# Patient Record
Sex: MALE | Race: WHITE | Employment: FULL TIME | URBAN - METROPOLITAN AREA
[De-identification: names, ages, dates, MRNs, and addresses within clinical notes are randomized per-mention and may not be internally consistent; named-entity substitution may affect disease eponyms.]

---

## 2024-01-18 ENCOUNTER — HOSPITAL ENCOUNTER (EMERGENCY)
Facility: HOSPITAL | Age: 44
Discharge: HOME | End: 2024-01-18
Attending: EMERGENCY MEDICINE
Payer: COMMERCIAL

## 2024-01-18 ENCOUNTER — OFFICE VISIT (OUTPATIENT)
Dept: FAMILY MEDICINE CLINIC | Facility: CLINIC | Age: 44
End: 2024-01-18
Payer: COMMERCIAL

## 2024-01-18 ENCOUNTER — APPOINTMENT (OUTPATIENT)
Dept: GENERAL RADIOLOGY | Facility: HOSPITAL | Age: 44
End: 2024-01-18
Attending: EMERGENCY MEDICINE
Payer: COMMERCIAL

## 2024-01-18 VITALS
BODY MASS INDEX: 24.34 KG/M2 | WEIGHT: 170 LBS | OXYGEN SATURATION: 97 % | TEMPERATURE: 98 F | HEART RATE: 66 BPM | HEIGHT: 70 IN | DIASTOLIC BLOOD PRESSURE: 96 MMHG | SYSTOLIC BLOOD PRESSURE: 149 MMHG | RESPIRATION RATE: 18 BRPM

## 2024-01-18 VITALS
WEIGHT: 170 LBS | BODY MASS INDEX: 24.34 KG/M2 | TEMPERATURE: 98 F | RESPIRATION RATE: 21 BRPM | SYSTOLIC BLOOD PRESSURE: 128 MMHG | HEIGHT: 70 IN | OXYGEN SATURATION: 95 % | DIASTOLIC BLOOD PRESSURE: 85 MMHG | HEART RATE: 57 BPM

## 2024-01-18 DIAGNOSIS — R07.9 CHEST PAIN, UNSPECIFIED TYPE: Primary | ICD-10-CM

## 2024-01-18 DIAGNOSIS — R10.13 EPIGASTRIC PAIN: Primary | ICD-10-CM

## 2024-01-18 LAB
ALBUMIN SERPL-MCNC: 4.9 G/DL (ref 3.2–4.8)
ALP LIVER SERPL-CCNC: 63 U/L
ALT SERPL-CCNC: 59 U/L
ANION GAP SERPL CALC-SCNC: 7 MMOL/L (ref 0–18)
AST SERPL-CCNC: 44 U/L (ref ?–34)
ATRIAL RATE: 73 BPM
BASOPHILS # BLD AUTO: 0.04 X10(3) UL (ref 0–0.2)
BASOPHILS NFR BLD AUTO: 0.6 %
BILIRUB DIRECT SERPL-MCNC: 0.2 MG/DL (ref ?–0.3)
BILIRUB SERPL-MCNC: 0.7 MG/DL (ref 0.3–1.2)
BUN BLD-MCNC: 13 MG/DL (ref 9–23)
BUN/CREAT SERPL: 12.6 (ref 10–20)
CALCIUM BLD-MCNC: 9.9 MG/DL (ref 8.7–10.4)
CHLORIDE SERPL-SCNC: 108 MMOL/L (ref 98–112)
CO2 SERPL-SCNC: 26 MMOL/L (ref 21–32)
CREAT BLD-MCNC: 1.03 MG/DL
DEPRECATED RDW RBC AUTO: 40.5 FL (ref 35.1–46.3)
EGFRCR SERPLBLD CKD-EPI 2021: 91 ML/MIN/1.73M2 (ref 60–?)
EOSINOPHIL # BLD AUTO: 0.39 X10(3) UL (ref 0–0.7)
EOSINOPHIL NFR BLD AUTO: 5.8 %
ERYTHROCYTE [DISTWIDTH] IN BLOOD BY AUTOMATED COUNT: 11.9 % (ref 11–15)
GLUCOSE BLD-MCNC: 91 MG/DL (ref 70–99)
HCT VFR BLD AUTO: 47.3 %
HGB BLD-MCNC: 16.4 G/DL
IMM GRANULOCYTES # BLD AUTO: 0.02 X10(3) UL (ref 0–1)
IMM GRANULOCYTES NFR BLD: 0.3 %
LIPASE SERPL-CCNC: 48 U/L (ref 13–75)
LYMPHOCYTES # BLD AUTO: 2.18 X10(3) UL (ref 1–4)
LYMPHOCYTES NFR BLD AUTO: 32.4 %
MCH RBC QN AUTO: 31.8 PG (ref 26–34)
MCHC RBC AUTO-ENTMCNC: 34.7 G/DL (ref 31–37)
MCV RBC AUTO: 91.8 FL
MONOCYTES # BLD AUTO: 0.74 X10(3) UL (ref 0.1–1)
MONOCYTES NFR BLD AUTO: 11 %
NEUTROPHILS # BLD AUTO: 3.36 X10 (3) UL (ref 1.5–7.7)
NEUTROPHILS # BLD AUTO: 3.36 X10(3) UL (ref 1.5–7.7)
NEUTROPHILS NFR BLD AUTO: 49.9 %
OSMOLALITY SERPL CALC.SUM OF ELEC: 292 MOSM/KG (ref 275–295)
P AXIS: 54 DEGREES
P-R INTERVAL: 128 MS
PLATELET # BLD AUTO: 239 10(3)UL (ref 150–450)
POTASSIUM SERPL-SCNC: 3.9 MMOL/L (ref 3.5–5.1)
PROT SERPL-MCNC: 7.7 G/DL (ref 5.7–8.2)
Q-T INTERVAL: 384 MS
QRS DURATION: 114 MS
QTC CALCULATION (BEZET): 423 MS
R AXIS: 55 DEGREES
RBC # BLD AUTO: 5.15 X10(6)UL
SODIUM SERPL-SCNC: 141 MMOL/L (ref 136–145)
T AXIS: 57 DEGREES
TROPONIN I SERPL HS-MCNC: 3 NG/L
VENTRICULAR RATE: 73 BPM
WBC # BLD AUTO: 6.7 X10(3) UL (ref 4–11)

## 2024-01-18 PROCEDURE — 3080F DIAST BP >= 90 MM HG: CPT | Performed by: NURSE PRACTITIONER

## 2024-01-18 PROCEDURE — 36415 COLL VENOUS BLD VENIPUNCTURE: CPT

## 2024-01-18 PROCEDURE — 83690 ASSAY OF LIPASE: CPT | Performed by: EMERGENCY MEDICINE

## 2024-01-18 PROCEDURE — 80076 HEPATIC FUNCTION PANEL: CPT | Performed by: EMERGENCY MEDICINE

## 2024-01-18 PROCEDURE — 80048 BASIC METABOLIC PNL TOTAL CA: CPT | Performed by: EMERGENCY MEDICINE

## 2024-01-18 PROCEDURE — 99215 OFFICE O/P EST HI 40 MIN: CPT | Performed by: NURSE PRACTITIONER

## 2024-01-18 PROCEDURE — 3077F SYST BP >= 140 MM HG: CPT | Performed by: NURSE PRACTITIONER

## 2024-01-18 PROCEDURE — 84484 ASSAY OF TROPONIN QUANT: CPT | Performed by: EMERGENCY MEDICINE

## 2024-01-18 PROCEDURE — 93005 ELECTROCARDIOGRAM TRACING: CPT

## 2024-01-18 PROCEDURE — 85025 COMPLETE CBC W/AUTO DIFF WBC: CPT | Performed by: EMERGENCY MEDICINE

## 2024-01-18 PROCEDURE — 3008F BODY MASS INDEX DOCD: CPT | Performed by: NURSE PRACTITIONER

## 2024-01-18 PROCEDURE — 99285 EMERGENCY DEPT VISIT HI MDM: CPT

## 2024-01-18 PROCEDURE — 71045 X-RAY EXAM CHEST 1 VIEW: CPT | Performed by: EMERGENCY MEDICINE

## 2024-01-18 PROCEDURE — 99284 EMERGENCY DEPT VISIT MOD MDM: CPT

## 2024-01-18 PROCEDURE — 93010 ELECTROCARDIOGRAM REPORT: CPT

## 2024-01-18 RX ORDER — GEMFIBROZIL 600 MG/1
600 TABLET, FILM COATED ORAL 2 TIMES DAILY
COMMUNITY
Start: 2024-01-12

## 2024-01-18 NOTE — ED PROVIDER NOTES
Patient Seen in: [facility] Emergency Department    History     Chief Complaint   Patient presents with    Chest Pain Angina     Chest pain, abdominal pulsating, radiating left arm pain       HPI    43-year-old male with past medical history significant for hypertriglyceridemia presents to the ED for evaluation of epigastric abdominal discomfort that started last night.  He states that he had a very sharp discomfort that lasted for several minutes and then since then he has had more dull discomfort.  Right now he states that he feels like that dull discomfort radiates to the center of his back.  He denies any nausea, vomiting, shortness of breath, fever, chills, diarrhea, constipation, sweats.    History reviewed.   Past Medical History:   Diagnosis Date    H. pylori infection     Hyperlipidemia        History reviewed.   Past Surgical History:   Procedure Laterality Date    APPENDECTOMY           Medications :  (Not in a hospital admission)       No family history on file.    Smoking Status:   Social History     Socioeconomic History    Marital status:    Tobacco Use    Smoking status: Every Day       Constitutional and vital signs reviewed.      Social History and Family History elements reviewed from today, pertinent positives to the presenting problem noted.    Physical Exam     ED Triage Vitals [01/18/24 1311]   BP (!) 156/101   Pulse 69   Resp 16   Temp 98 °F (36.7 °C)   Temp src Temporal   SpO2 99 %   O2 Device None (Room air)       Physical Exam   Constitutional: AAOx3, well nourished, NAD  HEENT: Normocephalic, PERRLA, MMM  CV: s1s2+, RRR, no m/r/g, normal distal pulses  Pulmonary/Chest: CTA b/l with no rales, wheezes.  No chest wall tenderness  Abdominal: Nontender.  Nondistended. Soft. Bowel sounds are normal.   Neck/Back:   :   Musculoskeletal: Normal range of motion. No deformity.   Neurological: Awake, alert. Normal reflexes. No cranial nerve deficit.    Skin: Skin is warm and dry. No  rash noted. No erythema.   Psychiatric:      All measures to prevent infection transmission during my interaction with the patient were taken. The patient was already wearing a droplet mask on my arrival to the room. Personal protective equipment was worn throughout the duration of the exam.      ED Course        Labs Reviewed   HEPATIC FUNCTION PANEL (7) - Abnormal; Notable for the following components:       Result Value    AST 44 (*)     ALT 59 (*)     Albumin 4.9 (*)     All other components within normal limits   BASIC METABOLIC PANEL (8) - Normal   TROPONIN I HIGH SENSITIVITY - Normal   LIPASE - Normal   CBC WITH DIFFERENTIAL WITH PLATELET    Narrative:     The following orders were created for panel order CBC With Differential With Platelet.                  Procedure                               Abnormality         Status                                     ---------                               -----------         ------                                     CBC W/ DIFFERENTIAL[[id number]]                              Final result                                                 Please view results for these tests on the individual orders.   RAINBOW DRAW BLUE   RAINBOW DRAW GOLD   CBC W/ DIFFERENTIAL     My Independent Interpretation of EKG (if performed): EKG    Rate, intervals and axes as noted on EKG Report.  Rate: 73 bpm  Rhythm: Sinus Rhythm  Reading: Normal sinus rhythm, no acute ST changes, normal axis and intervals, no ectopy             Monitor Interpretation:   normal sinus rhythm as interpreted by me.      Imaging Results Available and Reviewed while in ED: XR CHEST AP PORTABLE  (CPT=71045)    Result Date: 1/18/2024  CONCLUSION: Normal    Dictated by (CST): Adelfo Jessica MD on 1/18/2024 at 2:58 PM     Finalized by (CST): Adelfo Jessica MD on 1/18/2024 at 2:59 PM         ED Medications Administered: Medications - No data to display          MDM     Vitals:    01/18/24 1415 01/18/24 1430 01/18/24 1445  01/18/24 1500   BP: 130/85 129/86 128/85 128/80   Pulse: 58 59 56 60   Resp: 19 17 19 19   Temp:       TempSrc:       SpO2: 96% 97% 96% 96%   Weight:       Height:         *I personally reviewed and interpreted all ED vitals.    Pulse Ox: 99%, Room air, Normal     Independent Interpretation of Studies: Independently reviewed patient's chest x-ray and there are no acute findings    History from Independent Source:       External Records Reviewed:     Social Determinants of Health:     Procedures:      Differential/MDM/Shared Decision Making: Differential diagnosis includes ACS, pancreatitis, cholecystitis, GERD, gastritis, ischemic bowel, indigestion, others.      The patient already has history of hypertriglyceridemia to contribute to the complexity of this ED evaluation.    Patient's has unremarkable workup in the ED.  His troponin, EKG, chest x-ray, lipase, are all negative.  He has a very mild transaminitis which may be related to fatty liver disease.  He does not have any right upper quadrant discomfort on exam.  He has more discomfort in the epigastrium and left upper quadrant.  Symptoms are atypical and I think are related to intestinal distention.  Discussed case with patient and he is comfortable with discharge.      Condition upon leaving the department: Stable    Disposition and Plan     Clinical Impression:  1. Epigastric pain        Disposition:  Discharge    Follow-up:  Long Schmidt MD  [address]  [address]  [facility] [address] [phone]    Call in 2 day(s)        Medications Prescribed:  Current Discharge Medication List

## 2024-01-18 NOTE — PROGRESS NOTES
Pt arrived at [facility] complaining of intermittent chest pain for 5 days with last night being the worst pain and now has left arm pain radiating to elbow location. Also feels that his abdomen is pulsating. Instructed pt to report directly to emergency room, offered EMS, pt refused, reviewed the importance of prompt evaluation for issues including MI and AAA, pt states that he feels safe to drive and will head directly to [facility] ER. Report added to ER triage/arrival board.

## 2024-02-01 DIAGNOSIS — I25.10 CAD (CORONARY ARTERY DISEASE): Primary | ICD-10-CM

## 2024-03-06 ENCOUNTER — TELEPHONE (OUTPATIENT)
Dept: INTERVENTIONAL RADIOLOGY/VASCULAR | Age: 44
End: 2024-03-06

## 2024-03-07 ENCOUNTER — HOSPITAL ENCOUNTER (OUTPATIENT)
Dept: CT IMAGING | Age: 44
Discharge: HOME | End: 2024-03-07
Attending: INTERNAL MEDICINE

## 2024-03-07 VITALS
HEART RATE: 51 BPM | OXYGEN SATURATION: 97 % | DIASTOLIC BLOOD PRESSURE: 81 MMHG | SYSTOLIC BLOOD PRESSURE: 126 MMHG | RESPIRATION RATE: 15 BRPM | TEMPERATURE: 96.8 F

## 2024-03-07 DIAGNOSIS — I25.10 CAD (CORONARY ARTERY DISEASE): ICD-10-CM

## 2024-03-07 LAB
CREAT SERPL-MCNC: 1 MG/DL (ref 0.67–1.17)
EGFRCR SERPLBLD CKD-EPI 2021: >90 ML/MIN/{1.73_M2}

## 2024-03-07 PROCEDURE — 75574 CT ANGIO HRT W/3D IMAGE: CPT

## 2024-03-07 PROCEDURE — 10002803 HB RX 637

## 2024-03-07 PROCEDURE — 75574 CT ANGIO HRT W/3D IMAGE: CPT | Performed by: INTERNAL MEDICINE

## 2024-03-07 PROCEDURE — 82565 ASSAY OF CREATININE: CPT

## 2024-03-07 PROCEDURE — 10002803 HB RX 637: Performed by: INTERNAL MEDICINE

## 2024-03-07 RX ORDER — GEMFIBROZIL 600 MG/1
600 TABLET, FILM COATED ORAL
COMMUNITY
Start: 2024-01-12

## 2024-03-07 RX ORDER — NITROGLYCERIN 0.4 MG/1
TABLET SUBLINGUAL PRN
Status: COMPLETED | OUTPATIENT
Start: 2024-03-07 | End: 2024-03-07

## 2024-03-07 RX ORDER — SILDENAFIL 50 MG/1
50 TABLET, FILM COATED ORAL DAILY PRN
COMMUNITY
Start: 2024-01-16

## 2024-03-07 RX ADMIN — NITROGLYCERIN 0.8 MG: 0.4 TABLET SUBLINGUAL at 09:30

## 2025-04-02 DIAGNOSIS — J98.11 ATELECTASIS: Primary | ICD-10-CM

## 2025-04-15 ENCOUNTER — HOSPITAL ENCOUNTER (OUTPATIENT)
Dept: CT IMAGING | Age: 45
Discharge: HOME | End: 2025-04-15
Attending: FAMILY MEDICINE

## 2025-04-15 DIAGNOSIS — J98.11 ATELECTASIS: ICD-10-CM

## 2025-04-15 PROCEDURE — 71260 CT THORAX DX C+: CPT

## 2025-04-15 PROCEDURE — 10002805 HB CONTRAST AGENT

## 2025-04-15 RX ADMIN — IOHEXOL 80 ML: 350 INJECTION, SOLUTION INTRAVENOUS at 09:19
